# Patient Record
Sex: MALE | Race: WHITE | Employment: FULL TIME | URBAN - METROPOLITAN AREA
[De-identification: names, ages, dates, MRNs, and addresses within clinical notes are randomized per-mention and may not be internally consistent; named-entity substitution may affect disease eponyms.]

---

## 2022-04-28 ENCOUNTER — HOSPITAL ENCOUNTER (EMERGENCY)
Age: 27
Discharge: HOME | End: 2022-04-28
Attending: EMERGENCY MEDICINE

## 2022-04-28 VITALS
BODY MASS INDEX: 22.22 KG/M2 | WEIGHT: 150 LBS | TEMPERATURE: 99.3 F | DIASTOLIC BLOOD PRESSURE: 66 MMHG | HEIGHT: 69 IN | SYSTOLIC BLOOD PRESSURE: 119 MMHG | OXYGEN SATURATION: 99 % | RESPIRATION RATE: 16 BRPM | HEART RATE: 107 BPM

## 2022-04-28 DIAGNOSIS — R22.0 LIP SWELLING: Primary | ICD-10-CM

## 2022-04-28 DIAGNOSIS — R22.0 GINGIVAL SWELLING: ICD-10-CM

## 2022-04-28 DIAGNOSIS — F15.10 METHAMPHETAMINE USE (HCC): ICD-10-CM

## 2022-04-28 PROCEDURE — 99283 EMERGENCY DEPT VISIT LOW MDM: CPT

## 2022-04-28 RX ORDER — CLINDAMYCIN HYDROCHLORIDE 150 MG/1
300 CAPSULE ORAL 3 TIMES DAILY
Qty: 42 CAPSULE | Refills: 0 | Status: SHIPPED | OUTPATIENT
Start: 2022-04-28 | End: 2022-05-05

## 2022-04-28 NOTE — DISCHARGE INSTRUCTIONS
Dr. Wagner Guerra, [facility] [address], [facility]  [address]:  330 [facility], [address]  [phone]  [place]aver[place] Hitchcock, and Extractions    [address]  [address] [facility], [address] [place]  [phone]  [place]cock, and Extractions    [facility]  [address] [facility] 159  [phone]  Fillings, Cleaning, and [address] [facility] [place]  [address] [place], [address] [facility]  [phone]    SMITA BURR [facility] Department  [address], [address]  [phone]  Ages 3-18, if attending [address], [address] [place] [address] Road  [phone]  Extractions, Miguel Willard, [facility]    [facility]  [address], [address] [facility] [address]  [phone]  Oral Surgery - $70 required  Edgar Holter, Extractions - $100 Required    [address]   [address], [address]  [phone]  [facility] Only    [address], [address] [facility]  Dental Clinic Open September to June

## 2022-04-28 NOTE — ED PROVIDER NOTES
EMERGENCY DEPARTMENT HISTORY AND PHYSICAL EXAM    Date: 4/28/2022  Patient Name: Doris Jeffrey. History of Presenting Illness     Chief Complaint   Patient presents with    Lip Swelling    Jaw Swelling         History Provided By: Patient    Chief Complaint: lip swelling    HPI(Context):   11:24 AM  Doris Jeffrey. is a 27 y.o. male with PMHX marijuana use who presents to the emergency department C/O lower lip swelling. Associated sxs include lesions on inside of lower lip. Pt notes he was smoking meth 2 days ago after which lesions appeared. Pt explains that he is not a daily meth user and is not addicted to meth. Pt denies sore throat, trouble swallowing, cough, CP, SOB, IV drug use, and any other sxs or complaints. PCP: None        Past History     Past Medical History:  No past medical history on file. Past Surgical History:  No past surgical history on file. Family History:  No family history on file. Social History:  Social History     Tobacco Use    Smoking status: Not on file    Smokeless tobacco: Not on file   Substance Use Topics    Alcohol use: Not on file    Drug use: Not on file       Allergies:  No Known Allergies      Review of Systems   Review of Systems   Constitutional: Negative for fever. HENT: Positive for facial swelling (lower lip swelling). Negative for dental problem and sore throat. Respiratory: Negative for shortness of breath. Cardiovascular: Negative for chest pain. Gastrointestinal: Negative for vomiting. All other systems reviewed and are negative. Physical Exam     Vitals:    04/28/22 1118 04/28/22 1215   BP: 119/66    Pulse: (!) 119 (!) 107   Resp: 16    Temp: 99.3 °F (37.4 °C)    SpO2: 99% 99%   Weight: 68 kg (150 lb)    Height: 5' 9\" (1.753 m)      Physical Exam  Vitals and nursing note reviewed. Constitutional:       General: He is not in acute distress. Appearance: He is well-developed. He is not diaphoretic.       Comments: Mildly anxious  male in NAD. Alert. In fast track room. HENT:      Head: Normocephalic and atraumatic. Jaw: No trismus. Right Ear: External ear normal.      Left Ear: External ear normal.      Nose: Nose normal. No mucosal edema or rhinorrhea. Mouth/Throat:      Mouth: Mucous membranes are dry. Oral lesions (few non-tender burn like lesions to mucosa of bottom lip) present. Dentition: Gingival swelling present. No dental tenderness or dental abscesses. Tongue: No lesions. Pharynx: Uvula midline. No pharyngeal swelling, oropharyngeal exudate, posterior oropharyngeal erythema or uvula swelling. Tonsils: No tonsillar exudate or tonsillar abscesses. Comments: Moderate swelling of lower lip. No tongue or oropharyngeal involvement. Eyes:      General: No scleral icterus. Right eye: No discharge. Left eye: No discharge. Conjunctiva/sclera: Conjunctivae normal.   Cardiovascular:      Rate and Rhythm: Regular rhythm. Tachycardia present. Heart sounds: Normal heart sounds. No murmur heard. No friction rub. No gallop. Pulmonary:      Effort: Pulmonary effort is normal. No tachypnea, accessory muscle usage or respiratory distress. Breath sounds: Normal breath sounds. No decreased breath sounds, wheezing, rhonchi or rales. Musculoskeletal:         General: Normal range of motion. Cervical back: Normal range of motion. Lymphadenopathy:      Cervical: No cervical adenopathy. Skin:     General: Skin is warm and dry. Neurological:      Mental Status: He is alert and oriented to person, place, and time. Psychiatric:         Judgment: Judgment normal.             Diagnostic Study Results     Labs -   No results found for this or any previous visit (from the past 12 hour(s)).         No orders to display     CT Results  (Last 48 hours)    None        CXR Results  (Last 48 hours)    None          Medications given in the ED-  Medications - No data to display      Medical Decision Making   I am the first provider for this patient. I reviewed the vital signs, available nursing notes, past medical history, past surgical history, family history and social history. Vital Signs-Reviewed the patient's vital signs. Pulse Oximetry Analysis - 99% on RA. NORMAL      Records Reviewed: Nursing Notes    Provider Notes (Medical Decision Making):     Procedures:  Procedures    ED Course:   11:24 AM Initial assessment performed. The patients presenting problems have been discussed, and they are in agreement with the care plan formulated and outlined with them. I have encouraged them to ask questions as they arise throughout their visit. Diagnosis and Disposition       Lesions/burns to mucosa of lower lip from smoking meth recently. No abscess. No tongue or oropharyngeal swelling. Handling secretions. Will clinda. Discussed proper care. Advised no more meth use and reviewed risks. Reasons to RTED discussed with pt. All questions answered. Pt feels comfortable going home at this time. Pt expressed understanding and he agrees with plan. 1. Lip swelling    2. Gingival swelling    3. Methamphetamine use ([address]        PLAN:  1. D/C Home  2. Discharge Medication List as of 4/28/2022 12:11 PM        3. Follow-up Information     Follow up With Specialties Details Why [address]    [facility]   [address] [place] [address]  [phone]        _______________________________    Attestations: This note is prepared by Todd Baum PA-C.  _______________________________          Please note that this dictation was completed with [employer], the [email or web address] voice recognition software. Quite often unanticipated grammatical, syntax, homophones, and other interpretive errors are inadvertently transcribed by the computer software. Please disregard these errors.   Please excuse any errors that have escaped final proofreading.